# Patient Record
Sex: FEMALE | Race: OTHER | HISPANIC OR LATINO | URBAN - METROPOLITAN AREA
[De-identification: names, ages, dates, MRNs, and addresses within clinical notes are randomized per-mention and may not be internally consistent; named-entity substitution may affect disease eponyms.]

---

## 2023-02-20 ENCOUNTER — OCCMED (OUTPATIENT)
Age: 23
End: 2023-02-20

## 2023-02-20 DIAGNOSIS — Z11.1 SCREENING FOR TUBERCULOSIS: Primary | ICD-10-CM

## 2023-04-01 ENCOUNTER — OCCMED (OUTPATIENT)
Age: 23
End: 2023-04-01

## 2023-04-01 DIAGNOSIS — Z11.1 ENCOUNTER FOR PPD TEST: Primary | ICD-10-CM

## 2023-04-01 NOTE — PROGRESS NOTES
[facility]   PPD test part one completed  Pt advised to return for PPD read